# Patient Record
Sex: MALE | Race: WHITE | NOT HISPANIC OR LATINO
[De-identification: names, ages, dates, MRNs, and addresses within clinical notes are randomized per-mention and may not be internally consistent; named-entity substitution may affect disease eponyms.]

---

## 2019-12-05 ENCOUNTER — TRANSCRIPTION ENCOUNTER (OUTPATIENT)
Age: 84
End: 2019-12-05

## 2021-07-13 PROBLEM — Z00.00 ENCOUNTER FOR PREVENTIVE HEALTH EXAMINATION: Status: ACTIVE | Noted: 2021-07-13

## 2021-07-14 ENCOUNTER — APPOINTMENT (OUTPATIENT)
Dept: HEPATOLOGY | Facility: CLINIC | Age: 86
End: 2021-07-14
Payer: MEDICARE

## 2021-07-14 ENCOUNTER — LABORATORY RESULT (OUTPATIENT)
Age: 86
End: 2021-07-14

## 2021-07-14 VITALS
BODY MASS INDEX: 37.65 KG/M2 | HEART RATE: 60 BPM | RESPIRATION RATE: 14 BRPM | TEMPERATURE: 97.9 F | HEIGHT: 65 IN | WEIGHT: 226 LBS | DIASTOLIC BLOOD PRESSURE: 52 MMHG | SYSTOLIC BLOOD PRESSURE: 124 MMHG

## 2021-07-14 DIAGNOSIS — Z86.79 PERSONAL HISTORY OF OTHER DISEASES OF THE CIRCULATORY SYSTEM: ICD-10-CM

## 2021-07-14 DIAGNOSIS — Z87.891 PERSONAL HISTORY OF NICOTINE DEPENDENCE: ICD-10-CM

## 2021-07-14 DIAGNOSIS — Z86.39 PERSONAL HISTORY OF OTHER ENDOCRINE, NUTRITIONAL AND METABOLIC DISEASE: ICD-10-CM

## 2021-07-14 LAB
AFP-TM SERPL-MCNC: 268 NG/ML
HBV SURFACE AB SER QL: REACTIVE
HBV SURFACE AG SER QL: NONREACTIVE
HCV AB SER QL: NONREACTIVE
HCV S/CO RATIO: 0.22 S/CO
IGA SER QL IEP: 238 MG/DL
IGG SER QL IEP: 1552 MG/DL
IGM SER QL IEP: 90 MG/DL
INR PPP: 1.19 RATIO
PT BLD: 14.1 SEC

## 2021-07-14 PROCEDURE — 99072 ADDL SUPL MATRL&STAF TM PHE: CPT

## 2021-07-14 PROCEDURE — 99204 OFFICE O/P NEW MOD 45 MIN: CPT

## 2021-07-14 RX ORDER — METFORMIN HYDROCHLORIDE 1000 MG/1
1000 TABLET, FILM COATED, EXTENDED RELEASE ORAL
Refills: 0 | Status: ACTIVE | COMMUNITY

## 2021-07-14 RX ORDER — IPRATROPIUM BROMIDE AND ALBUTEROL SULFATE 2.5; .5 MG/3ML; MG/3ML
0.5-2.5 (3) SOLUTION RESPIRATORY (INHALATION)
Refills: 0 | Status: ACTIVE | COMMUNITY

## 2021-07-14 RX ORDER — INSULIN GLARGINE 100 [IU]/ML
100 INJECTION, SOLUTION SUBCUTANEOUS
Refills: 0 | Status: ACTIVE | COMMUNITY

## 2021-07-14 RX ORDER — DIGOXIN 0.12 MG/1
125 TABLET ORAL
Refills: 0 | Status: ACTIVE | COMMUNITY

## 2021-07-14 RX ORDER — HYDROCHLOROTHIAZIDE 50 MG/1
50 TABLET ORAL
Refills: 0 | Status: ACTIVE | COMMUNITY

## 2021-07-14 RX ORDER — ASPIRIN 81 MG
81 TABLET, DELAYED RELEASE (ENTERIC COATED) ORAL
Refills: 0 | Status: ACTIVE | COMMUNITY

## 2021-07-15 ENCOUNTER — NON-APPOINTMENT (OUTPATIENT)
Age: 86
End: 2021-07-15

## 2021-07-15 LAB
ALBUMIN SERPL ELPH-MCNC: 3.5 G/DL
ALP BLD-CCNC: 67 U/L
ALT SERPL-CCNC: 15 U/L
ANION GAP SERPL CALC-SCNC: 10 MMOL/L
AST SERPL-CCNC: 24 U/L
BASOPHILS # BLD AUTO: 0.02 K/UL
BASOPHILS NFR BLD AUTO: 0.3 %
BILIRUB SERPL-MCNC: 0.9 MG/DL
BUN SERPL-MCNC: 23 MG/DL
CALCIUM SERPL-MCNC: 9.2 MG/DL
CHLORIDE SERPL-SCNC: 105 MMOL/L
CO2 SERPL-SCNC: 27 MMOL/L
CREAT SERPL-MCNC: 1.14 MG/DL
EOSINOPHIL # BLD AUTO: 0.13 K/UL
EOSINOPHIL NFR BLD AUTO: 2.3 %
FERRITIN SERPL-MCNC: 73 NG/ML
HCT VFR BLD CALC: 33.5 %
HGB BLD-MCNC: 10 G/DL
IMM GRANULOCYTES NFR BLD AUTO: 0.2 %
IRON SATN MFR SERPL: 21 %
IRON SERPL-MCNC: 62 UG/DL
LYMPHOCYTES # BLD AUTO: 1.29 K/UL
LYMPHOCYTES NFR BLD AUTO: 22.6 %
MAN DIFF?: NORMAL
MCHC RBC-ENTMCNC: 26.7 PG
MCHC RBC-ENTMCNC: 29.9 GM/DL
MCV RBC AUTO: 89.6 FL
MONOCYTES # BLD AUTO: 0.45 K/UL
MONOCYTES NFR BLD AUTO: 7.9 %
NEUTROPHILS # BLD AUTO: 3.82 K/UL
NEUTROPHILS NFR BLD AUTO: 66.7 %
PLATELET # BLD AUTO: 58 K/UL
POTASSIUM SERPL-SCNC: 5.3 MMOL/L
PROT SERPL-MCNC: 6.2 G/DL
RBC # BLD: 3.74 M/UL
RBC # FLD: 16 %
SODIUM SERPL-SCNC: 142 MMOL/L
TIBC SERPL-MCNC: 293 UG/DL
TTG IGA SER IA-ACNC: <1.2 U/ML
TTG IGA SER-ACNC: NEGATIVE
TTG IGG SER IA-ACNC: 12 U/ML
TTG IGG SER IA-ACNC: POSITIVE
UIBC SERPL-MCNC: 231 UG/DL
WBC # FLD AUTO: 5.72 K/UL

## 2021-07-16 LAB
MITOCHONDRIA AB SER IF-ACNC: NORMAL
SMOOTH MUSCLE AB SER QL IF: NORMAL

## 2021-07-19 LAB
ANA PAT FLD IF-IMP: ABNORMAL
ANA SER IF-ACNC: ABNORMAL

## 2021-07-20 LAB
A1AT PHENOTYP SERPL-IMP: NORMAL
A1AT SERPL-MCNC: 134 MG/DL

## 2021-08-31 ENCOUNTER — APPOINTMENT (OUTPATIENT)
Dept: HEPATOLOGY | Facility: CLINIC | Age: 86
End: 2021-08-31
Payer: MEDICARE

## 2021-08-31 ENCOUNTER — APPOINTMENT (OUTPATIENT)
Dept: ULTRASOUND IMAGING | Facility: CLINIC | Age: 86
End: 2021-08-31
Payer: MEDICARE

## 2021-08-31 PROCEDURE — 91200 LIVER ELASTOGRAPHY: CPT

## 2021-08-31 PROCEDURE — 76700 US EXAM ABDOM COMPLETE: CPT

## 2021-09-01 ENCOUNTER — OUTPATIENT (OUTPATIENT)
Dept: OUTPATIENT SERVICES | Facility: HOSPITAL | Age: 86
LOS: 1 days | End: 2021-09-01
Payer: MEDICARE

## 2021-09-01 VITALS
TEMPERATURE: 98 F | HEIGHT: 64 IN | SYSTOLIC BLOOD PRESSURE: 108 MMHG | HEART RATE: 62 BPM | RESPIRATION RATE: 16 BRPM | OXYGEN SATURATION: 95 % | WEIGHT: 218.04 LBS | DIASTOLIC BLOOD PRESSURE: 52 MMHG

## 2021-09-01 DIAGNOSIS — Z98.890 OTHER SPECIFIED POSTPROCEDURAL STATES: Chronic | ICD-10-CM

## 2021-09-01 DIAGNOSIS — R18.8 OTHER ASCITES: ICD-10-CM

## 2021-09-01 DIAGNOSIS — Z98.49 CATARACT EXTRACTION STATUS, UNSPECIFIED EYE: Chronic | ICD-10-CM

## 2021-09-01 DIAGNOSIS — Z95.5 PRESENCE OF CORONARY ANGIOPLASTY IMPLANT AND GRAFT: ICD-10-CM

## 2021-09-01 DIAGNOSIS — E11.9 TYPE 2 DIABETES MELLITUS WITHOUT COMPLICATIONS: ICD-10-CM

## 2021-09-01 DIAGNOSIS — Z90.49 ACQUIRED ABSENCE OF OTHER SPECIFIED PARTS OF DIGESTIVE TRACT: Chronic | ICD-10-CM

## 2021-09-01 DIAGNOSIS — Z01.818 ENCOUNTER FOR OTHER PREPROCEDURAL EXAMINATION: ICD-10-CM

## 2021-09-01 LAB
ALBUMIN SERPL ELPH-MCNC: 3.5 G/DL — SIGNIFICANT CHANGE UP (ref 3.3–5)
ALP SERPL-CCNC: 75 U/L — SIGNIFICANT CHANGE UP (ref 40–120)
ALT FLD-CCNC: 12 U/L — SIGNIFICANT CHANGE UP (ref 10–45)
ANION GAP SERPL CALC-SCNC: 12 MMOL/L — SIGNIFICANT CHANGE UP (ref 5–17)
APTT BLD: 30.3 SEC — SIGNIFICANT CHANGE UP (ref 27.5–35.5)
AST SERPL-CCNC: 22 U/L — SIGNIFICANT CHANGE UP (ref 10–40)
BILIRUB SERPL-MCNC: 0.9 MG/DL — SIGNIFICANT CHANGE UP (ref 0.2–1.2)
BUN SERPL-MCNC: 20 MG/DL — SIGNIFICANT CHANGE UP (ref 7–23)
CALCIUM SERPL-MCNC: 9.3 MG/DL — SIGNIFICANT CHANGE UP (ref 8.4–10.5)
CHLORIDE SERPL-SCNC: 103 MMOL/L — SIGNIFICANT CHANGE UP (ref 96–108)
CO2 SERPL-SCNC: 28 MMOL/L — SIGNIFICANT CHANGE UP (ref 22–31)
CREAT SERPL-MCNC: 1.18 MG/DL — SIGNIFICANT CHANGE UP (ref 0.5–1.3)
GLUCOSE SERPL-MCNC: 93 MG/DL — SIGNIFICANT CHANGE UP (ref 70–99)
HCT VFR BLD CALC: 30.9 % — LOW (ref 39–50)
HGB BLD-MCNC: 9.8 G/DL — LOW (ref 13–17)
INR BLD: 1.08 RATIO — SIGNIFICANT CHANGE UP (ref 0.88–1.16)
MCHC RBC-ENTMCNC: 28.1 PG — SIGNIFICANT CHANGE UP (ref 27–34)
MCHC RBC-ENTMCNC: 31.7 GM/DL — LOW (ref 32–36)
MCV RBC AUTO: 88.5 FL — SIGNIFICANT CHANGE UP (ref 80–100)
NRBC # BLD: 0 /100 WBCS — SIGNIFICANT CHANGE UP (ref 0–0)
PLATELET # BLD AUTO: 87 K/UL — LOW (ref 150–400)
POTASSIUM SERPL-MCNC: 4.5 MMOL/L — SIGNIFICANT CHANGE UP (ref 3.5–5.3)
POTASSIUM SERPL-SCNC: 4.5 MMOL/L — SIGNIFICANT CHANGE UP (ref 3.5–5.3)
PROT SERPL-MCNC: 7 G/DL — SIGNIFICANT CHANGE UP (ref 6–8.3)
PROTHROM AB SERPL-ACNC: 12.9 SEC — SIGNIFICANT CHANGE UP (ref 10.6–13.6)
RBC # BLD: 3.49 M/UL — LOW (ref 4.2–5.8)
RBC # FLD: 15.7 % — HIGH (ref 10.3–14.5)
SODIUM SERPL-SCNC: 143 MMOL/L — SIGNIFICANT CHANGE UP (ref 135–145)
WBC # BLD: 5.06 K/UL — SIGNIFICANT CHANGE UP (ref 3.8–10.5)
WBC # FLD AUTO: 5.06 K/UL — SIGNIFICANT CHANGE UP (ref 3.8–10.5)

## 2021-09-01 PROCEDURE — 85610 PROTHROMBIN TIME: CPT

## 2021-09-01 PROCEDURE — 85027 COMPLETE CBC AUTOMATED: CPT

## 2021-09-01 PROCEDURE — 80053 COMPREHEN METABOLIC PANEL: CPT

## 2021-09-01 PROCEDURE — 85730 THROMBOPLASTIN TIME PARTIAL: CPT

## 2021-09-01 PROCEDURE — 83036 HEMOGLOBIN GLYCOSYLATED A1C: CPT

## 2021-09-01 PROCEDURE — G0463: CPT

## 2021-09-01 NOTE — H&P PST ADULT - OTHER CARE PROVIDERS
Dr. Anirudh Carranza (Hepatologist) [phone]; Dr. Bruce Mohr (Cardiologist) [phone]; Dr. Sukhdeep Smith (CHF Specialist) [phone]; Dr. Jun Gustafson (Pulmonary) [phone]

## 2021-09-01 NOTE — H&P PST ADULT - NSICDXPASTSURGICALHX_GEN_ALL_CORE_FT
PAST SURGICAL HISTORY:  H/O carotid endarterectomy around 12 years ago    History of open heart surgery CABG x 3 around 2009    S/P appendectomy 1950s    S/P cataract surgery B/L about 7 years ago    Status post medial meniscus repair over 30 years ago     PAST SURGICAL HISTORY:  H/O carotid endarterectomy left side; around 12 years ago    History of open heart surgery CABG x 3 around 2009    S/P appendectomy 1950s    S/P cataract surgery B/L about 7 years ago    Status post medial meniscus repair over 30 years ago

## 2021-09-01 NOTE — H&P PST ADULT - NSICDXPASTMEDICALHX_GEN_ALL_CORE_FT
PAST MEDICAL HISTORY:  2019 novel coronavirus disease (COVID-19) 3/2020 - Hospitalized x 3 days at [facility]; No intubations, No ICU - symptoms of PNA, SOB, No fevers      Cardiac pacemaker 7/2020 - Pt is unsure as to why the pacemaker was placed; The procedure was performed during the same admission as the valve replacement  [employer]tronic  Implant Date 6/24/2020  Serial Number [id number]  Model Number [id number]-29US    Cirrhosis of liver     Congestive heart failure     Former smoker     History of common carotid artery stent placement     OA (osteoarthritis) B/L Knees    Obese     Obstructive sleep apnea on CPAP over 10 years    Peripheral edema     Renal calculi passed on own; over 40 years ago    S/P CABG x 3 2009    Stented coronary artery     Type 2 diabetes mellitus On metformin and lantus     PAST MEDICAL HISTORY:  2019 novel coronavirus disease (COVID-19) 3/2020 - Hospitalized x 3 days at [facility]; No intubations, No ICU - symptoms of PNA, SOB, No fevers      Cardiac pacemaker 7/2020 - Pt is unsure as to why the pacemaker was placed; The procedure was performed during the same admission as the valve replacement at [facility] a few days after the valve replacement  [employer]tronic  Implant Date 6/24/2020  Serial Number [id number]  Model Number [id number]-29US    Cirrhosis of liver Recently diagnosed 6/2021 during hospital admission    Congestive heart failure Pt unsure of staging of heart failure    Former smoker Quit over 30 years ago    History of common carotid artery stent placement Pt unsure of which carotid artery on left side, "over 12 years ago"    OA (osteoarthritis) B/L Knees    Obese BMI 37.4    Obstructive sleep apnea on CPAP over 10 years    Peripheral edema as per patient, recent B/L LE doppler negative for DVTs with cardiologist; Pt reports having peripheral edema since his CABG x 3    Renal calculi passed on own; over 40 years ago    S/P CABG x 3 2009; complicated by staph infection in the chest mesh s/p mesh removal, as per patient - chest closure done with muscle    Stented coronary artery Poor historian, pt reports having cardiac stents (possibly 2-3?) that were placed "years ago" at [facility]    Type 2 diabetes mellitus On metformin and lantus     PAST MEDICAL HISTORY:  2019 novel coronavirus disease (COVID-19) 3/2020 - Hospitalized x 3 days at [facility]; No intubations, No ICU - symptoms of PNA, SOB, No fevers      CAD (coronary artery disease)     Cardiac pacemaker 7/2020 - Pt is unsure as to why the pacemaker was placed; The procedure was performed during the same admission as the valve replacement at [facility] a few days after the valve replacement  [employer]tr[employer]  Implant Date 6/24/2020  Serial Number [id number]  Model Number [id number]-29US    Cirrhosis of liver Recently diagnosed 6/2021 during hospital admission    Congestive heart failure Pt unsure of staging of heart failure    Former smoker Quit over 30 years ago    History of common carotid artery stent placement Pt unsure of which carotid artery on left side, "over 12 years ago"    OA (osteoarthritis) B/L Knees    Obese BMI 37.4    Obstructive sleep apnea on CPAP over 10 years    Peripheral edema as per patient, recent B/L LE doppler negative for DVTs with cardiologist; Pt reports having peripheral edema since his CABG x 3    Renal calculi passed on own; over 40 years ago    S/P CABG x 3 2009; complicated by staph infection in the chest mesh s/p mesh removal, as per patient - chest closure done with muscle    Stented coronary artery Poor historian, pt reports having cardiac stents (possibly 2-3?) that were placed "years ago" at [facility]    Type 2 diabetes mellitus On metformin and lantus

## 2021-09-01 NOTE — H&P PST ADULT - PROBLEM SELECTOR PLAN 1
Pt is scheduled for an endoscopy under anesthesia with Dr. Carranza on 9/14/2021  Covid PCR test scheduled for 9/11/2021 at [facility]  Pending medical evaluation by heart failure specialist Dr. Smith on 9/9/21 Pt is scheduled for an endoscopy under anesthesia with Dr. Carranza on 9/14/2021  Covid PCR test scheduled for 9/11/2021 at [facility]  Pending medical evaluation by heart failure specialist Dr. Smith on 9/9/21  Pending pacemaker interrogation - Daughter will f/u with cardiologist on who to call for interrogation report and fax over to PST

## 2021-09-01 NOTE — H&P PST ADULT - LAST CARDIAC ANGIOGRAM/IMAGING
Possibly 3 years ago at [facility] - Diagnostic with no intervention due to chest pain Possibly 3 years ago at [facility] - Diagnostic with no intervention due to chest pain/dyspnea

## 2021-09-01 NOTE — H&P PST ADULT - HISTORY OF PRESENT ILLNESS
85 year old male with PMH Pacemaker, DMT2 (on lantus and metformin), Obesity, "2 valve replacements on the right side 10 years ago and Summer of 2021 at [facility],"  Left endarterectomy with stent placement (over 12 years ago), CABG x 3 (2009),     and CHF reports recent hospitalization at [facility] in June 2021 for CHF. As part of the evaluation, he underwent a CAT scan which revealed a cirrhotic liver with a small amount of periheptatic ascites. He reports no hx of liver disease, jaundice, abdominal complaints or pruritis.    Denies Recent travel, Exposure or Covid symptoms   Covid PCR test scheduled for 9/11 at [facility]  Covid Vaccine Pfizer 2nd dose received 4/3/2021  Covid Positive 3/2020 - Hospitalized x 3 days at [facility]; No intubations, No ICU - symptoms of PNA, SOB, No fevers 85 year old male with PMH Pacemaker, DMT2 (on lantus and metformin), Obesity (BMI 37.4), Cardiac valve replacement of same valve x 2 (10 years ago at [place] and replaced 6/2021 at [facility]; Pt poor historian - unsure of which valve), Left endarterectomy with stent placement (over 12 years ago), CABG x 3 (2009), Cardiac stents x 2-3? (unsure of when or how many; pt reports that it was "a long time ago") and CHF (pt unsure of staging) with peripheral edema reports recent hospitalization at [facility] in June 2021 for CHF. As part of the evaluation, he underwent a CAT scan which revealed a cirrhotic liver with a small amount of periheptatic ascites. He reports no hx of liver disease, jaundice, abdominal complaints or pruritis. Pt now presents to [facility] for scheduled endoscopy under anesthesia with Dr. Carranza on 9/14/21.    Denies Recent travel, Exposure or Covid symptoms   Covid PCR test scheduled for 9/11 at [facility]  Covid Vaccine Pfizer 2nd dose received 4/3/2021  Covid Positive 3/2020 - Hospitalized x 3 days at [facility]; No intubations, No ICU - symptoms of PNA (unsure of what side or severity), SOB, Denies fevers 85 year old male with PMH Pacemaker (Placed 6/2020), DMT2 (on lantus and metformin), Obesity (BMI 37.4), Cardiac valve replacement of same valve x 2 (10 years ago at [place] and replaced 6/2021 at [facility]; Pt poor historian - unsure of which valve), Left endarterectomy with stent placement (over 12 years ago), CABG x 3 (2009), Cardiac stents x 2-3? (unsure of when or how many; pt reports that it was "a long time ago") and CHF (pt unsure of staging) with peripheral edema reports recent hospitalization at [facility] in June 2021 for CHF. As part of the evaluation, he underwent a CAT scan which revealed a cirrhotic liver with a small amount of periheptatic ascites. He reports no hx of liver disease, jaundice, abdominal complaints or pruritis. Pt now presents to [facility] for scheduled endoscopy under anesthesia with Dr. Carranza on 9/14/21.    Denies Recent travel, Exposure or Covid symptoms   Covid PCR test scheduled for 9/11 at [facility]  Covid Vaccine Pfizer 2nd dose received 4/3/2021  Covid Positive 3/2020 - Hospitalized x 3 days at [facility]; No intubations, No ICU - symptoms of PNA (unsure of what side or severity), SOB, Denies fevers 85 year old male with PMH CAD, Pacemaker (Placed 6/2020), DMT2 (on lantus and metformin), Obesity (BMI 37.4), Cardiac valve replacement of same valve x 2 (10 years ago at [place] and replaced 6/2021 at [facility]; Pt poor historian - unsure of which valve), Left endarterectomy with stent placement (over 12 years ago), CABG x 3 (2009), Cardiac stents x 2-3? (unsure of when or how many; pt reports that it was "a long time ago") and CHF (pt unsure of staging) with peripheral edema reports recent hospitalization at [facility] in June 2021 for CHF. As part of the evaluation, he underwent a CAT scan which revealed a cirrhotic liver with a small amount of periheptatic ascites. He reports no hx of liver disease, jaundice, abdominal complaints or pruritis. Pt now presents to [facility] for scheduled endoscopy under anesthesia with Dr. Carranza on 9/14/21.    Denies Recent travel, Exposure or Covid symptoms   Covid PCR test scheduled for 9/11 at [facility]  Covid Vaccine Pfizer 2nd dose received 4/3/2021  Covid Positive 3/2020 - Hospitalized x 3 days at [facility]; No intubations, No ICU - symptoms of PNA (unsure of what side or severity), SOB, Denies fevers 85 year old male (poor historian) with PMH CAD, Pacemaker (Placed 6/2020), DMT2 (on lantus and metformin), Obesity (BMI 37.4), Cardiac valve replacement of same valve x 2 (10 years ago at [place] and replaced 6/2021 at [facility]; Pt poor historian - unsure of which valve), Left endarterectomy with stent placement (over 12 years ago), CABG x 3 (2009), Cardiac stents x 2-3? (unsure of when or how many; pt reports that it was "a long time ago") and CHF (pt unsure of staging) with peripheral edema reports recent hospitalization at [facility] in June 2021 for CHF. As part of the evaluation, he underwent a CAT scan which revealed a cirrhotic liver with a small amount of periheptatic ascites. He reports no hx of liver disease, jaundice, abdominal complaints or pruritis. Pt now presents to [facility] for scheduled endoscopy under anesthesia with Dr. Carranza on 9/14/21.    Denies Recent travel, Exposure or Covid symptoms   Covid PCR test scheduled for 9/11 at [facility]  Covid Vaccine Pfizer 2nd dose received 4/3/2021  Covid Positive 3/2020 - Hospitalized x 3 days at [facility]; No intubations, No ICU - symptoms of PNA (unsure of what side or severity), SOB, Denies fevers

## 2021-09-01 NOTE — H&P PST ADULT - PROBLEM SELECTOR PLAN 2
Advised patient to hold metformin 24 hours prior to procedure; Last dose, 9/13/21 AM Dose  Advised patient to reduce bedtime lantus dose on 9/13/21 and administer 16 units SQ

## 2021-09-01 NOTE — H&P PST ADULT - VISION (WITH CORRECTIVE LENSES IF THE PATIENT USUALLY WEARS THEM):
corrective glasses, reading glasses/Partially impaired: cannot see medication labels or newsprint, but can see obstacles in path, and the surrounding layout; can count fingers at arm's length

## 2021-09-01 NOTE — H&P PST ADULT - PROBLEM SELECTOR PLAN 3
Continue ASA 81 mg DOS  Advised pt to hold plavix 5 days prior to procedure; Last dose 9/9/21 morning dose

## 2021-09-02 LAB
A1C WITH ESTIMATED AVERAGE GLUCOSE RESULT: 5.6 % — SIGNIFICANT CHANGE UP (ref 4–5.6)
ESTIMATED AVERAGE GLUCOSE: 114 MG/DL — SIGNIFICANT CHANGE UP (ref 68–114)

## 2021-09-09 PROBLEM — E11.9 TYPE 2 DIABETES MELLITUS WITHOUT COMPLICATIONS: Chronic | Status: ACTIVE | Noted: 2021-09-01

## 2021-09-09 PROBLEM — E66.9 OBESITY, UNSPECIFIED: Chronic | Status: ACTIVE | Noted: 2021-09-01

## 2021-09-09 PROBLEM — R60.9 EDEMA, UNSPECIFIED: Chronic | Status: ACTIVE | Noted: 2021-09-01

## 2021-09-09 PROBLEM — I50.9 HEART FAILURE, UNSPECIFIED: Chronic | Status: ACTIVE | Noted: 2021-09-01

## 2021-09-09 PROBLEM — G47.33 OBSTRUCTIVE SLEEP APNEA (ADULT) (PEDIATRIC): Chronic | Status: ACTIVE | Noted: 2021-09-01

## 2021-09-09 PROBLEM — Z95.1 PRESENCE OF AORTOCORONARY BYPASS GRAFT: Chronic | Status: ACTIVE | Noted: 2021-09-01

## 2021-09-09 PROBLEM — Z98.890 OTHER SPECIFIED POSTPROCEDURAL STATES: Chronic | Status: ACTIVE | Noted: 2021-09-01

## 2021-09-09 PROBLEM — K74.60 UNSPECIFIED CIRRHOSIS OF LIVER: Chronic | Status: ACTIVE | Noted: 2021-09-01

## 2021-09-09 PROBLEM — Z87.891 PERSONAL HISTORY OF NICOTINE DEPENDENCE: Chronic | Status: ACTIVE | Noted: 2021-09-01

## 2021-09-09 PROBLEM — N20.0 CALCULUS OF KIDNEY: Chronic | Status: ACTIVE | Noted: 2021-09-01

## 2021-09-09 PROBLEM — I25.10 ATHEROSCLEROTIC HEART DISEASE OF NATIVE CORONARY ARTERY WITHOUT ANGINA PECTORIS: Chronic | Status: ACTIVE | Noted: 2021-09-01

## 2021-09-09 PROBLEM — M19.90 UNSPECIFIED OSTEOARTHRITIS, UNSPECIFIED SITE: Chronic | Status: ACTIVE | Noted: 2021-09-01

## 2021-09-09 PROBLEM — Z95.5 PRESENCE OF CORONARY ANGIOPLASTY IMPLANT AND GRAFT: Chronic | Status: ACTIVE | Noted: 2021-09-01

## 2021-09-09 PROBLEM — U07.1 COVID-19: Chronic | Status: ACTIVE | Noted: 2021-09-01

## 2021-09-09 PROBLEM — Z95.0 PRESENCE OF CARDIAC PACEMAKER: Chronic | Status: ACTIVE | Noted: 2021-09-01

## 2021-09-11 ENCOUNTER — OUTPATIENT (OUTPATIENT)
Dept: OUTPATIENT SERVICES | Facility: HOSPITAL | Age: 86
LOS: 1 days | End: 2021-09-11
Payer: MEDICARE

## 2021-09-11 DIAGNOSIS — Z90.49 ACQUIRED ABSENCE OF OTHER SPECIFIED PARTS OF DIGESTIVE TRACT: Chronic | ICD-10-CM

## 2021-09-11 DIAGNOSIS — Z98.890 OTHER SPECIFIED POSTPROCEDURAL STATES: Chronic | ICD-10-CM

## 2021-09-11 DIAGNOSIS — Z11.52 ENCOUNTER FOR SCREENING FOR COVID-19: ICD-10-CM

## 2021-09-11 DIAGNOSIS — Z98.49 CATARACT EXTRACTION STATUS, UNSPECIFIED EYE: Chronic | ICD-10-CM

## 2021-09-11 LAB — SARS-COV-2 RNA SPEC QL NAA+PROBE: SIGNIFICANT CHANGE UP

## 2021-09-11 PROCEDURE — C9803: CPT

## 2021-09-11 PROCEDURE — U0005: CPT

## 2021-09-11 PROCEDURE — U0003: CPT

## 2021-09-14 ENCOUNTER — OUTPATIENT (OUTPATIENT)
Dept: OUTPATIENT SERVICES | Facility: HOSPITAL | Age: 86
LOS: 1 days | End: 2021-09-14
Payer: MEDICARE

## 2021-09-14 ENCOUNTER — APPOINTMENT (OUTPATIENT)
Dept: HEPATOLOGY | Facility: HOSPITAL | Age: 86
End: 2021-09-14

## 2021-09-14 VITALS
SYSTOLIC BLOOD PRESSURE: 138 MMHG | HEART RATE: 61 BPM | OXYGEN SATURATION: 96 % | RESPIRATION RATE: 15 BRPM | DIASTOLIC BLOOD PRESSURE: 50 MMHG

## 2021-09-14 VITALS
WEIGHT: 259.93 LBS | RESPIRATION RATE: 20 BRPM | OXYGEN SATURATION: 98 % | HEART RATE: 60 BPM | DIASTOLIC BLOOD PRESSURE: 62 MMHG | SYSTOLIC BLOOD PRESSURE: 126 MMHG | TEMPERATURE: 97 F | HEIGHT: 65 IN

## 2021-09-14 DIAGNOSIS — Z98.890 OTHER SPECIFIED POSTPROCEDURAL STATES: Chronic | ICD-10-CM

## 2021-09-14 DIAGNOSIS — Z01.818 ENCOUNTER FOR OTHER PREPROCEDURAL EXAMINATION: ICD-10-CM

## 2021-09-14 DIAGNOSIS — Z98.49 CATARACT EXTRACTION STATUS, UNSPECIFIED EYE: Chronic | ICD-10-CM

## 2021-09-14 DIAGNOSIS — Z90.49 ACQUIRED ABSENCE OF OTHER SPECIFIED PARTS OF DIGESTIVE TRACT: Chronic | ICD-10-CM

## 2021-09-14 DIAGNOSIS — R18.8 OTHER ASCITES: ICD-10-CM

## 2021-09-14 LAB — GLUCOSE BLDC GLUCOMTR-MCNC: 85 MG/DL — SIGNIFICANT CHANGE UP (ref 70–99)

## 2021-09-14 PROCEDURE — 43235 EGD DIAGNOSTIC BRUSH WASH: CPT

## 2021-09-14 PROCEDURE — 82962 GLUCOSE BLOOD TEST: CPT

## 2021-09-14 PROCEDURE — 43235 EGD DIAGNOSTIC BRUSH WASH: CPT | Mod: GC

## 2021-09-14 RX ORDER — RANOLAZINE 500 MG/1
1 TABLET, FILM COATED, EXTENDED RELEASE ORAL
Qty: 0 | Refills: 0 | DISCHARGE

## 2021-09-14 RX ORDER — POTASSIUM CHLORIDE 20 MEQ
1 PACKET (EA) ORAL
Qty: 0 | Refills: 0 | DISCHARGE

## 2021-09-14 RX ORDER — METFORMIN HYDROCHLORIDE 850 MG/1
1 TABLET ORAL
Qty: 0 | Refills: 0 | DISCHARGE

## 2021-09-14 RX ORDER — METOPROLOL TARTRATE 50 MG
1 TABLET ORAL
Qty: 0 | Refills: 0 | DISCHARGE

## 2021-09-14 RX ORDER — MAGNESIUM OXIDE 400 MG ORAL TABLET 241.3 MG
1 TABLET ORAL
Qty: 0 | Refills: 0 | DISCHARGE

## 2021-09-14 RX ORDER — INSULIN GLARGINE 100 [IU]/ML
20 INJECTION, SOLUTION SUBCUTANEOUS
Qty: 0 | Refills: 0 | DISCHARGE

## 2021-09-14 RX ORDER — CLOPIDOGREL BISULFATE 75 MG/1
1 TABLET, FILM COATED ORAL
Qty: 0 | Refills: 0 | DISCHARGE

## 2021-09-14 RX ORDER — SODIUM CHLORIDE 9 MG/ML
500 INJECTION INTRAMUSCULAR; INTRAVENOUS; SUBCUTANEOUS
Refills: 0 | Status: COMPLETED | OUTPATIENT
Start: 2021-09-14 | End: 2021-09-14

## 2021-09-14 RX ORDER — ISOSORBIDE MONONITRATE 60 MG/1
1 TABLET, EXTENDED RELEASE ORAL
Qty: 0 | Refills: 0 | DISCHARGE

## 2021-09-14 RX ADMIN — SODIUM CHLORIDE 75 MILLILITER(S): 9 INJECTION INTRAMUSCULAR; INTRAVENOUS; SUBCUTANEOUS at 12:35

## 2021-09-14 NOTE — ASU PATIENT PROFILE, ADULT - NSICDXPASTMEDICALHX_GEN_ALL_CORE_FT
PAST MEDICAL HISTORY:  2019 novel coronavirus disease (COVID-19) 3/2020 - Hospitalized x 3 days at [facility]; No intubations, No ICU - symptoms of PNA, SOB, No fevers      CAD (coronary artery disease)     Cardiac pacemaker 7/2020 - Pt is unsure as to why the pacemaker was placed; The procedure was performed during the same admission as the valve replacement at [facility] a few days after the valve replacement  [employer]tr[employer]  Implant Date 6/24/2020  Serial Number [id number]  Model Number [id number]-29US    Cirrhosis of liver Recently diagnosed 6/2021 during hospital admission    Congestive heart failure Pt unsure of staging of heart failure    Former smoker Quit over 30 years ago    History of common carotid artery stent placement Pt unsure of which carotid artery on left side, "over 12 years ago"    OA (osteoarthritis) B/L Knees    Obese BMI 37.4    Obstructive sleep apnea on CPAP over 10 years    Peripheral edema as per patient, recent B/L LE doppler negative for DVTs with cardiologist; Pt reports having peripheral edema since his CABG x 3    Renal calculi passed on own; over 40 years ago    S/P CABG x 3 2009; complicated by staph infection in the chest mesh s/p mesh removal, as per patient - chest closure done with muscle    Stented coronary artery Poor historian, pt reports having cardiac stents (possibly 2-3?) that were placed "years ago" at [facility]    Type 2 diabetes mellitus On metformin and lantus

## 2021-09-14 NOTE — PRE PROCEDURE NOTE - PRE PROCEDURE EVALUATION
Attending Physician:  Dillon                      Procedure:    Indication for Procedure:  ________________________________________________________  PAST MEDICAL & SURGICAL HISTORY:  2019 novel coronavirus disease (COVID-19)  3/2020 - Hospitalized x 3 days at [facility]; No intubations, No ICU - symptoms of PNA, SOB, No fevers      Cardiac pacemaker  7/2020 - Pt is unsure as to why the pacemaker was placed; The procedure was performed during the same admission as the valve replacement at [facility] a few days after the valve replacement    [employer]tronic  Implant Date 6/24/2020  Serial Number [id number]  Model Number [id number]-29US    Type 2 diabetes mellitus  On metformin and lantus    Peripheral edema  as per patient, recent B/L LE doppler negative for DVTs with cardiologist; Pt reports having peripheral edema since his CABG x 3    Congestive heart failure  Pt unsure of staging of heart failure    Obese  BMI 37.4    S/P CABG x 3  2009; complicated by staph infection in the chest mesh s/p mesh removal, as per patient - chest closure done with muscle    Stented coronary artery  Poor historian, pt reports having cardiac stents (possibly 2-3?) that were placed &quot;years ago&quot; at [facility]    History of common carotid artery stent placement  Pt unsure of which carotid artery on left side, &quot;over 12 years ago&quot;    Obstructive sleep apnea on CPAP  over 10 years    Cirrhosis of liver  Recently diagnosed 6/2021 during hospital admission    Renal calculi  passed on own; over 40 years ago    OA (osteoarthritis)  B/L Knees    Former smoker  Quit over 30 years ago    CAD (coronary artery disease)    H/O carotid endarterectomy  left side; around 12 years ago    History of open heart surgery  CABG x 3 around 2009    S/P cataract surgery  B/L about 7 years ago    S/P appendectomy  1950s    Status post medial meniscus repair  over 30 years ago      ALLERGIES:  penicillin (Other)    HOME MEDICATIONS:  clopidogrel 75 mg oral tablet: 1 tab(s) orally once a day  isosorbide mononitrate 60 mg oral tablet, extended release: 1 tab(s) orally once a day (in the morning)  Klor-Con M20 oral tablet, extended release: 1 tab(s) orally once a day  Lantus 100 units/mL subcutaneous solution: 20 unit(s) subcutaneous once a day (at bedtime)  Mag-Ox 400 oral tablet: 1 tab(s) orally once a day  metFORMIN 500 mg oral tablet: 1 tab(s) orally 2 times a day  ranolazine 500 mg oral tablet, extended release: 1 tab(s) orally 2 times a day  torsemide 20 mg oral tablet: 3 tab(s) orally once a day    AICD/PPM: [ ] yes   [ ] no    PERTINENT LAB DATA:                      PHYSICAL EXAMINATION:    T(C): --  HR: --  BP: --  RR: --  SpO2: --    Constitutional: NAD  HEENT: PERRLA, EOMI,    Neck:  No JVD  Respiratory: CTAB/L  Cardiovascular: S1 and S2  Gastrointestinal: BS+, soft, NT/ND  Extremities: No peripheral edema  Neurological: A/O x 3, no focal deficits  Psychiatric: Normal mood, normal affect  Skin: No rashes    ASA Class: I [ ]  II [X ]  III [ ]  IV [ ]    COMMENTS:    The patient is a suitable candidate for the planned procedure unless box checked [ ]  No, explain:

## 2021-09-14 NOTE — ASU PATIENT PROFILE, ADULT - NSICDXPASTSURGICALHX_GEN_ALL_CORE_FT
PAST SURGICAL HISTORY:  H/O carotid endarterectomy left side; around 12 years ago    History of open heart surgery CABG x 3 around 2009    S/P appendectomy 1950s    S/P cataract surgery B/L about 7 years ago    Status post medial meniscus repair over 30 years ago

## 2021-09-16 ENCOUNTER — NON-APPOINTMENT (OUTPATIENT)
Age: 86
End: 2021-09-16

## 2021-09-16 DIAGNOSIS — R18.8 OTHER ASCITES: ICD-10-CM

## 2021-09-16 DIAGNOSIS — K74.60 UNSPECIFIED CIRRHOSIS OF LIVER: ICD-10-CM

## 2021-12-20 NOTE — ASU PATIENT PROFILE, ADULT - FALL HARM RISK
[facility]as Guan  [address] [facility] [id number]      2021      : 1955    Dear Mr. Guan:    We have been trying to reach you without success.  Please call my office at [phone].    Thank you for your timely response.    Sincerely,         Xochitl Ro MD         bones(Osteoporosis,prev fx,steroid use,metastatic bone ca